# Patient Record
Sex: FEMALE | Race: WHITE | NOT HISPANIC OR LATINO | Employment: UNEMPLOYED | URBAN - METROPOLITAN AREA
[De-identification: names, ages, dates, MRNs, and addresses within clinical notes are randomized per-mention and may not be internally consistent; named-entity substitution may affect disease eponyms.]

---

## 2021-11-28 ENCOUNTER — WALK IN (OUTPATIENT)
Dept: URGENT CARE | Age: 26
End: 2021-11-28

## 2021-11-28 VITALS
OXYGEN SATURATION: 98 % | WEIGHT: 140 LBS | TEMPERATURE: 98.1 F | DIASTOLIC BLOOD PRESSURE: 62 MMHG | SYSTOLIC BLOOD PRESSURE: 118 MMHG | HEART RATE: 78 BPM | RESPIRATION RATE: 16 BRPM

## 2021-11-28 DIAGNOSIS — R21 RASH: Primary | ICD-10-CM

## 2021-11-28 PROCEDURE — 99203 OFFICE O/P NEW LOW 30 MIN: CPT | Performed by: FAMILY MEDICINE

## 2021-11-28 RX ORDER — PREDNISONE 20 MG/1
20 TABLET ORAL DAILY
Qty: 5 TABLET | Refills: 0 | Status: SHIPPED | OUTPATIENT
Start: 2021-11-28

## 2021-11-28 RX ORDER — EPINEPHRINE 0.3 MG/.3ML
0.3 INJECTION SUBCUTANEOUS
COMMUNITY
Start: 2021-06-02

## 2021-11-28 RX ORDER — DESOGESTREL AND ETHINYL ESTRADIOL 0.15-0.03
KIT ORAL
COMMUNITY
Start: 2021-11-19

## 2021-11-28 RX ORDER — FEXOFENADINE HCL 180 MG/1
180 TABLET ORAL DAILY
COMMUNITY

## 2022-06-21 ENCOUNTER — APPOINTMENT (OUTPATIENT)
Dept: URBAN - METROPOLITAN AREA CLINIC 256 | Age: 27
Setting detail: DERMATOLOGY
End: 2022-06-23

## 2022-06-21 VITALS — HEIGHT: 64 IN | WEIGHT: 150 LBS

## 2022-06-21 DIAGNOSIS — L90.5 SCAR CONDITIONS AND FIBROSIS OF SKIN: ICD-10-CM

## 2022-06-21 DIAGNOSIS — Z71.89 OTHER SPECIFIED COUNSELING: ICD-10-CM

## 2022-06-21 DIAGNOSIS — L57.8 OTHER SKIN CHANGES DUE TO CHRONIC EXPOSURE TO NONIONIZING RADIATION: ICD-10-CM

## 2022-06-21 DIAGNOSIS — D22 MELANOCYTIC NEVI: ICD-10-CM

## 2022-06-21 PROBLEM — D22.5 MELANOCYTIC NEVI OF TRUNK: Status: ACTIVE | Noted: 2022-06-21

## 2022-06-21 PROBLEM — D22.0 MELANOCYTIC NEVI OF LIP: Status: ACTIVE | Noted: 2022-06-21

## 2022-06-21 PROBLEM — D22.61 MELANOCYTIC NEVI OF RIGHT UPPER LIMB, INCLUDING SHOULDER: Status: ACTIVE | Noted: 2022-06-21

## 2022-06-21 PROBLEM — D22.9 MELANOCYTIC NEVI, UNSPECIFIED: Status: ACTIVE | Noted: 2022-06-21

## 2022-06-21 PROBLEM — D22.4 MELANOCYTIC NEVI OF SCALP AND NECK: Status: ACTIVE | Noted: 2022-06-21

## 2022-06-21 PROBLEM — D22.39 MELANOCYTIC NEVI OF OTHER PARTS OF FACE: Status: ACTIVE | Noted: 2022-06-21

## 2022-06-21 PROBLEM — D22.72 MELANOCYTIC NEVI OF LEFT LOWER LIMB, INCLUDING HIP: Status: ACTIVE | Noted: 2022-06-21

## 2022-06-21 PROBLEM — D22.71 MELANOCYTIC NEVI OF RIGHT LOWER LIMB, INCLUDING HIP: Status: ACTIVE | Noted: 2022-06-21

## 2022-06-21 PROBLEM — D22.62 MELANOCYTIC NEVI OF LEFT UPPER LIMB, INCLUDING SHOULDER: Status: ACTIVE | Noted: 2022-06-21

## 2022-06-21 PROCEDURE — OTHER MIPS QUALITY: OTHER

## 2022-06-21 PROCEDURE — 99203 OFFICE O/P NEW LOW 30 MIN: CPT

## 2022-06-21 PROCEDURE — OTHER ADDITIONAL NOTES: OTHER

## 2022-06-21 PROCEDURE — OTHER COUNSELING: OTHER

## 2022-06-21 PROCEDURE — OTHER REASSURANCE: OTHER

## 2022-06-21 ASSESSMENT — LOCATION DETAILED DESCRIPTION DERM
LOCATION DETAILED: RIGHT CENTRAL LATERAL NECK
LOCATION DETAILED: RIGHT ANTERIOR PROXIMAL THIGH
LOCATION DETAILED: LEFT INFERIOR CENTRAL MALAR CHEEK
LOCATION DETAILED: RIGHT ANTERIOR DISTAL THIGH
LOCATION DETAILED: RIGHT LOWER CUTANEOUS LIP
LOCATION DETAILED: RIGHT LATERAL 2ND TOE
LOCATION DETAILED: RIGHT DISTAL POSTERIOR THIGH
LOCATION DETAILED: LEFT ANTERIOR PROXIMAL THIGH
LOCATION DETAILED: LEFT PROXIMAL POSTERIOR UPPER ARM
LOCATION DETAILED: PERIUMBILICAL SKIN
LOCATION DETAILED: LEFT BUTTOCK
LOCATION DETAILED: RIGHT ANTERIOR SHOULDER
LOCATION DETAILED: LEFT DISTAL PRETIBIAL REGION
LOCATION DETAILED: LEFT LATERAL SUPERIOR CHEST
LOCATION DETAILED: RIGHT LATERAL ABDOMEN
LOCATION DETAILED: RIGHT PROXIMAL DORSAL FOREARM
LOCATION DETAILED: RIGHT SUPERIOR UPPER BACK

## 2022-06-21 ASSESSMENT — LOCATION SIMPLE DESCRIPTION DERM
LOCATION SIMPLE: NECK
LOCATION SIMPLE: LEFT BUTTOCK
LOCATION SIMPLE: LEFT CHEEK
LOCATION SIMPLE: RIGHT FOREARM
LOCATION SIMPLE: RIGHT 2ND TOE
LOCATION SIMPLE: CHEST
LOCATION SIMPLE: RIGHT POSTERIOR THIGH
LOCATION SIMPLE: RIGHT THIGH
LOCATION SIMPLE: ABDOMEN
LOCATION SIMPLE: RIGHT UPPER BACK
LOCATION SIMPLE: LEFT PRETIBIAL REGION
LOCATION SIMPLE: RIGHT SHOULDER
LOCATION SIMPLE: RIGHT LIP
LOCATION SIMPLE: LEFT POSTERIOR UPPER ARM
LOCATION SIMPLE: LEFT THIGH

## 2022-06-21 ASSESSMENT — LOCATION ZONE DERM
LOCATION ZONE: LEG
LOCATION ZONE: ARM
LOCATION ZONE: FACE
LOCATION ZONE: TRUNK
LOCATION ZONE: LIP
LOCATION ZONE: NECK
LOCATION ZONE: TOE

## 2022-06-21 NOTE — PROCEDURE: ADDITIONAL NOTES
Detail Level: Simple
Additional Notes: Notes from prior dermatologist reviewed--at prior visit this mole measured 9 mm, difficult to see in scanned [facility] notes. See clinical photo in our records and dermoscopy photo. Patient is monitoring lesion carefully and has noted no changes in size, shape, color, or contour. Can continue to be monitored; did offer removal for peace of mind and definitive pathologic evaluation, patient deferred for now.
Render Risk Assessment In Note?: no

## 2022-06-21 NOTE — PROCEDURE: REASSURANCE
Hide Additional Notes?: No
Detail Level: Detailed
Additional Notes (Optional): -Scar from previous procedure

## 2022-11-18 ENCOUNTER — OFFICE VISIT (OUTPATIENT)
Dept: FAMILY MEDICINE | Facility: CLINIC | Age: 27
End: 2022-11-18
Payer: COMMERCIAL

## 2022-11-18 VITALS
SYSTOLIC BLOOD PRESSURE: 119 MMHG | TEMPERATURE: 98.2 F | OXYGEN SATURATION: 96 % | RESPIRATION RATE: 18 BRPM | BODY MASS INDEX: 25.74 KG/M2 | HEIGHT: 64 IN | DIASTOLIC BLOOD PRESSURE: 72 MMHG | WEIGHT: 150.8 LBS | HEART RATE: 93 BPM

## 2022-11-18 DIAGNOSIS — R51.9 NONINTRACTABLE EPISODIC HEADACHE, UNSPECIFIED HEADACHE TYPE: ICD-10-CM

## 2022-11-18 DIAGNOSIS — Z11.4 SCREENING FOR HIV (HUMAN IMMUNODEFICIENCY VIRUS): ICD-10-CM

## 2022-11-18 DIAGNOSIS — Z13.1 SCREENING FOR DIABETES MELLITUS: ICD-10-CM

## 2022-11-18 DIAGNOSIS — E61.1 IRON DEFICIENCY: ICD-10-CM

## 2022-11-18 DIAGNOSIS — Z13.220 LIPID SCREENING: ICD-10-CM

## 2022-11-18 DIAGNOSIS — Z11.59 NEED FOR HEPATITIS C SCREENING TEST: ICD-10-CM

## 2022-11-18 DIAGNOSIS — Z30.8 ENCOUNTER FOR OTHER CONTRACEPTIVE MANAGEMENT: ICD-10-CM

## 2022-11-18 DIAGNOSIS — F41.9 ANXIETY: ICD-10-CM

## 2022-11-18 DIAGNOSIS — Z12.4 CERVICAL CANCER SCREENING: ICD-10-CM

## 2022-11-18 DIAGNOSIS — R79.89 LOW VITAMIN D LEVEL: ICD-10-CM

## 2022-11-18 DIAGNOSIS — Z00.00 ROUTINE HISTORY AND PHYSICAL EXAMINATION OF ADULT: Primary | ICD-10-CM

## 2022-11-18 LAB
ALBUMIN SERPL BCG-MCNC: 4.4 G/DL (ref 3.5–5.2)
ALP SERPL-CCNC: 61 U/L (ref 35–104)
ALT SERPL W P-5'-P-CCNC: 13 U/L (ref 10–35)
ANION GAP SERPL CALCULATED.3IONS-SCNC: 15 MMOL/L (ref 7–15)
AST SERPL W P-5'-P-CCNC: 17 U/L (ref 10–35)
BILIRUB SERPL-MCNC: 0.2 MG/DL
BUN SERPL-MCNC: 14.5 MG/DL (ref 6–20)
CALCIUM SERPL-MCNC: 9.5 MG/DL (ref 8.6–10)
CHLORIDE SERPL-SCNC: 104 MMOL/L (ref 98–107)
CHOLEST SERPL-MCNC: 215 MG/DL
CREAT SERPL-MCNC: 0.75 MG/DL (ref 0.51–0.95)
DEPRECATED HCO3 PLAS-SCNC: 20 MMOL/L (ref 22–29)
ERYTHROCYTE [DISTWIDTH] IN BLOOD BY AUTOMATED COUNT: 12.6 % (ref 10–15)
FERRITIN SERPL-MCNC: 5 NG/ML (ref 6–175)
GFR SERPL CREATININE-BSD FRML MDRD: >90 ML/MIN/1.73M2
GLUCOSE SERPL-MCNC: 95 MG/DL (ref 70–99)
HBA1C MFR BLD: 5.3 % (ref 0–5.6)
HCT VFR BLD AUTO: 38.1 % (ref 35–47)
HDLC SERPL-MCNC: 64 MG/DL
HGB BLD-MCNC: 12.5 G/DL (ref 11.7–15.7)
LDLC SERPL CALC-MCNC: 130 MG/DL
MCH RBC QN AUTO: 26.8 PG (ref 26.5–33)
MCHC RBC AUTO-ENTMCNC: 32.8 G/DL (ref 31.5–36.5)
MCV RBC AUTO: 82 FL (ref 78–100)
NONHDLC SERPL-MCNC: 151 MG/DL
PLATELET # BLD AUTO: 319 10E3/UL (ref 150–450)
POTASSIUM SERPL-SCNC: 4.7 MMOL/L (ref 3.4–5.3)
PROT SERPL-MCNC: 7.4 G/DL (ref 6.4–8.3)
RBC # BLD AUTO: 4.67 10E6/UL (ref 3.8–5.2)
SODIUM SERPL-SCNC: 139 MMOL/L (ref 136–145)
TRIGL SERPL-MCNC: 107 MG/DL
WBC # BLD AUTO: 9.6 10E3/UL (ref 4–11)

## 2022-11-18 PROCEDURE — 80053 COMPREHEN METABOLIC PANEL: CPT | Performed by: INTERNAL MEDICINE

## 2022-11-18 PROCEDURE — 99214 OFFICE O/P EST MOD 30 MIN: CPT | Mod: 25 | Performed by: INTERNAL MEDICINE

## 2022-11-18 PROCEDURE — 99385 PREV VISIT NEW AGE 18-39: CPT | Performed by: INTERNAL MEDICINE

## 2022-11-18 PROCEDURE — 83036 HEMOGLOBIN GLYCOSYLATED A1C: CPT | Performed by: INTERNAL MEDICINE

## 2022-11-18 PROCEDURE — 82728 ASSAY OF FERRITIN: CPT | Performed by: INTERNAL MEDICINE

## 2022-11-18 PROCEDURE — 80061 LIPID PANEL: CPT | Performed by: INTERNAL MEDICINE

## 2022-11-18 PROCEDURE — 36415 COLL VENOUS BLD VENIPUNCTURE: CPT | Performed by: INTERNAL MEDICINE

## 2022-11-18 PROCEDURE — 82306 VITAMIN D 25 HYDROXY: CPT | Performed by: INTERNAL MEDICINE

## 2022-11-18 PROCEDURE — 85027 COMPLETE CBC AUTOMATED: CPT | Performed by: INTERNAL MEDICINE

## 2022-11-18 RX ORDER — DESOGESTREL AND ETHINYL ESTRADIOL 0.15-0.03
1 KIT ORAL DAILY
Qty: 84 TABLET | Refills: 1 | Status: SHIPPED | OUTPATIENT
Start: 2022-11-18 | End: 2022-12-15

## 2022-11-18 RX ORDER — FERROUS SULFATE 325(65) MG
325 TABLET, DELAYED RELEASE (ENTERIC COATED) ORAL DAILY
Qty: 90 TABLET | Refills: 0 | Status: SHIPPED | OUTPATIENT
Start: 2022-11-18

## 2022-11-18 RX ORDER — PROPRANOLOL HYDROCHLORIDE 10 MG/1
TABLET ORAL
Qty: 90 TABLET | Refills: 0 | Status: SHIPPED | OUTPATIENT
Start: 2022-11-18 | End: 2023-02-14

## 2022-11-18 ASSESSMENT — ENCOUNTER SYMPTOMS
EYE PAIN: 0
NAUSEA: 0
HEMATURIA: 0
PARESTHESIAS: 0
DIZZINESS: 0
MYALGIAS: 0
FEVER: 0
HEADACHES: 0
WEAKNESS: 0
HEMATOCHEZIA: 0
CHILLS: 0
HEARTBURN: 0
PALPITATIONS: 0
CONSTIPATION: 0
ARTHRALGIAS: 0
COUGH: 0
FREQUENCY: 0
ABDOMINAL PAIN: 0
SHORTNESS OF BREATH: 0
NERVOUS/ANXIOUS: 1
JOINT SWELLING: 0
DYSURIA: 0
DIARRHEA: 0
SORE THROAT: 0

## 2022-11-18 ASSESSMENT — PAIN SCALES - GENERAL: PAINLEVEL: NO PAIN (0)

## 2022-11-18 NOTE — PROGRESS NOTES
SUBJECTIVE:   CC: Rebeca is an 27 year old who presents for preventive health visit.     Patient has been advised of split billing requirements and indicates understanding: Yes  Healthy Habits:     Getting at least 3 servings of Calcium per day:  Yes    Bi-annual eye exam:  Yes    Dental care twice a year:  Yes    Sleep apnea or symptoms of sleep apnea:  None    Diet:  Gluten-free/reduced    Frequency of exercise:  4-5 days/week    Duration of exercise:  45-60 minutes    Taking medications regularly:  Yes    Medication side effects:  Not applicable    PHQ-2 Total Score: 2    Additional concerns today:  Yes      OCP  ISIBLOOM    PROPRANOL 10 MG PRN ANXIETY    HISTORY OF MIGRAINE, HAS BAD ALLERGIES, GETTING ALLERGY SHOTS, AT AN [facility]ERGY CLINIC IN [facility], HAS HEADACHE AL HER LIFE, LIGHT OR NOSE DO NOT BOTHER HER SOMETIMES GETS NAUSEOUS, 1 TIME EVERY TOW.      On gluten free diet, had abdominal cramps,     Low ferritin, had anemia in past, periods are sometimes heavy, on iron supplerments    Had hemorrhoids, had blood in stool, one time 3 months ago    Gluten free diet , helps with your stress and energy, had loose stools prior to     PGF has crohn's disease      Has h/o anxiety , not depression, uses once every 2 weeks  Always work related, work related, helps and levels her out          Today's PHQ-2 Score:   PHQ-2 ( 1999 [employer]) 11/18/2022   Q1: Little interest or pleasure in doing things 1   Q2: Feeling down, depressed or hopeless 1   PHQ-2 Score 2   Q1: Little interest or pleasure in doing things Several days   Q2: Feeling down, depressed or hopeless Several days   PHQ-2 Score 2       Have you ever done Advance Care Planning? (For example, a Health Directive, POLST, or a discussion with a medical provider or your loved ones about your wishes): Yes, patient states has an Advance Care Planning document and will bring a copy to the clinic.    Social History     Tobacco Use     Smoking status: Not on file     Smokeless  tobacco: Not on file   Substance Use Topics     Alcohol use: Not on file     If you drink alcohol do you typically have >3 drinks per day or >7 drinks per week? No    Alcohol Use 11/18/2022   Prescreen: >3 drinks/day or >7 drinks/week? No   Prescreen: >3 drinks/day or >7 drinks/week? -   No flowsheet data found.    Reviewed orders with patient.  Reviewed health maintenance and updated orders accordingly - Yes  Lab work is in process  Labs reviewed in EPIC  BP Readings from Last 3 Encounters:   11/18/22 119/72    Wt Readings from Last 3 Encounters:   11/18/22 68.4 kg (150 lb 12.8 oz)                  There is no problem list on file for this patient.    No past surgical history on file.    Social History     Tobacco Use     Smoking status: Not on file     Smokeless tobacco: Not on file   Substance Use Topics     Alcohol use: Not on file     No family history on file.      Current Outpatient Medications   Medication Sig Dispense Refill     desogestrel-ethinyl estradiol (APRI) 0.15-30 MG-MCG tablet Take 1 tablet by mouth daily 84 tablet 1     ferrous sulfate (FE TABS) 325 (65 Fe) MG EC tablet Take 1 tablet (325 mg) by mouth daily take with vitamin C or orange juice 90 tablet 0     propranolol (INDERAL) 10 MG tablet Take one tablet daily as needed for anxiety 90 tablet 0     Not on File  Recent Labs   Lab Test 11/18/22  1051   A1C 5.3   *   HDL 64   TRIG 107   ALT 13   CR 0.75   GFRESTIMATED >90   POTASSIUM 4.7        Breast Cancer Screening:  Any new diagnosis of family breast, ovarian, or bowel cancer? Yes  mat GM had breat ca 2x        FHS-7:   Breast CA Risk Assessment (FHS-7) 11/18/2022   Did any of your first-degree relatives have breast or ovarian cancer? No   Did any of your relatives have bilateral breast cancer? No   Did any man in your family have breast cancer? No   Did any woman in your family have breast and ovarian cancer? Yes   Did any woman in your family have breast cancer before age 50 y? No  "  Do you have 2 or more relatives with breast and/or ovarian cancer? No   Do you have 2 or more relatives with breast and/or bowel cancer? No     click delete button to remove this line now  Patient under 40 years of age: Routine Mammogram Screening not recommended.   Pertinent mammograms are reviewed under the imaging tab.    History of abnormal Pap smear: Last 3 Pap Results: No results found for: PAP     Reviewed and updated as needed this visit by clinical staff      Problems             Reviewed and updated as needed this visit by Provider      Problems            No past medical history on file.   No past surgical history on file.  OB History   No obstetric history on file.       Review of Systems  CONSTITUTIONAL: NEGATIVE for fever, chills, change in weight  INTEGUMENTARU/SKIN: NEGATIVE for worrisome rashes, moles or lesions  EYES: NEGATIVE for vision changes or irritation  ENT: NEGATIVE for ear, mouth and throat problems  RESP: NEGATIVE for significant cough or SOB  BREAST: NEGATIVE for masses, tenderness or discharge  CV: NEGATIVE for chest pain, palpitations or peripheral edema  GI: NEGATIVE for nausea, abdominal pain, heartburn, or change in bowel habits  : NEGATIVE for unusual urinary or vaginal symptoms. Periods are regular.  MUSCULOSKELETAL: NEGATIVE for significant arthralgias or myalgia  NEURO: NEGATIVE for weakness, dizziness or paresthesias  PSYCHIATRIC: NEGATIVE for changes in mood or affect     OBJECTIVE:   /72 (BP Location: Left arm, Patient Position: Sitting, Cuff Size: Adult Regular)   Pulse 93   Temp 98.2  F (36.8  C) (Temporal)   Resp 18   Ht 1.613 m (5' 3.5\")   Wt 68.4 kg (150 lb 12.8 oz)   LMP 09/09/2022 (Exact Date)   SpO2 96%   BMI 26.29 kg/m    Physical Exam  GENERAL: healthy, alert and no distress  EYES: Eyes grossly normal to inspection, PERRL and conjunctivae and sclerae normal  HENT: ear canals and TM's normal, nose and mouth without ulcers or lesions  NECK: no " adenopathy, no asymmetry, masses, or scars and thyroid normal to palpation  RESP: lungs clear to auscultation - no rales, rhonchi or wheezes  CV: regular rate and rhythm, normal S1 S2, no S3 or S4, no murmur, click or rub, no peripheral edema and peripheral pulses strong  ABDOMEN: soft, nontender, no hepatosplenomegaly, no masses and bowel sounds normal  MS: no gross musculoskeletal defects noted, no edema  SKIN: no suspicious lesions or rashes  NEURO: Normal strength and tone, mentation intact and speech normal  PSYCH: mentation appears normal, affect normal/bright    Diagnostic Test Results:  Labs reviewed in Epic    ASSESSMENT/PLAN:   Rebeca was seen today for physical.    Diagnoses and all orders for this visit:    Routine history and physical examination of adult    Screening for HIV (human immunodeficiency virus)  Comments:  Patient declined    Need for hepatitis C screening test  Comments:  Patient declined      Cervical cancer screening  Comments:  Pap smear up-to-date    Encounter for other contraceptive management  Comments:  Discussed side effects of chronic oral hormone contraception.  She has history of chronic headaches paranasal sinuses frontal ethmoidal area denies any aura sXS  Orders:  -     Ob/Gyn Referral; Future  -     desogestrel-ethinyl estradiol (APRI) 0.15-30 MG-MCG tablet; Take 1 tablet by mouth daily    Anxiety  -     propranolol (INDERAL) 10 MG tablet; Take one tablet daily as needed for anxiety    Iron deficiency  -     Ferritin  -     CBC with platelets  -     ferrous sulfate (FE TABS) 325 (65 Fe) MG EC tablet; Take 1 tablet (325 mg) by mouth daily take with vitamin C or orange juice    Low vitamin D level  -     Vitamin D Deficiency    Screening for diabetes mellitus  -     Hemoglobin A1c    Lipid screening  -     Lipid panel reflex to direct LDL Fasting; Future  -     Lipid panel reflex to direct LDL Fasting    Nonintractable episodic headache, unspecified headache  type  Comments:  Chronic frontal ethmoidal paranasal, unsure if is related to sinus headaches or history of migraine she denies any aura associated.  On chroni oral contraceptiv  Orders:  -     CBC with platelets  -     Comprehensive metabolic panel (BMP + Alb, Alk Phos, ALT, AST, Total. Bili, TP); Future  -     Comprehensive metabolic panel (BMP + Alb, Alk Phos, ALT, AST, Total. Bili, TP)    Other orders  -     REVIEW OF HEALTH MAINTENANCE PROTOCOL ORDERS    has allergies to dust, pollen, cats dogs, getting allergy  Shots. Started in [place] 2022  Thinks headaches are allergies related, no aura associated which we discussed in detail as she is on OCP  TAKES PROPRANOLOL PRN FOR ANXIETY DISCUSSED SIDE EFFECTS, SHE HOLDS PRIOR TO ALLERGY SHOTS  Headache 5/20  ;Continues to  Function and work, ibuprofen helps.  No migraines with aura, she can continue on oral contraceptives.  She takes them 10 weeks at a time and then has a withdrawal bleeding at least 3-4 times per year establish with GYN, she is looking at other than hormonal types of contraception.  She takes propranolol as needed for anxiety, situational once per 2 weeks, discussed side effects tolerating well.  Gets allergy shots for history of allergies ,continue follow-up with allergy clinic.  Check some basic labs today.  She has low ferritin, she has some heavy menses at times.  She remains on iron replacement low-dose 65 mg daily and take with vitamin C or orange juice.  Do some screening labs today.    Patient has been advised of split billing requirements and indicates understanding: Yes      COUNSELING:  Reviewed preventive health counseling, as reflected in patient instructions       Regular exercise       Healthy diet/nutrition       Vision screening       Hearing screening       Aspirin prophylaxis       Alcohol Use       Contraception       Family planning       Folic Acid       Safe sex practices/STD prevention       Consider Hep C screening for all  patients one time for ages 18-79 years       Syphilis screening for high risk patients        HIV screeninx in teen years, 1x in adult years, and at intervals if high risk       Advance Care Planning        She has no history on file for tobacco use.          Sathish Lui MD  [facility]    Total time was 30 minutes review of records ,addressing multiple health conditions in addition to preventative yearly physical.

## 2022-11-19 LAB — DEPRECATED CALCIDIOL+CALCIFEROL SERPL-MC: 31 UG/L (ref 20–75)

## 2022-11-21 ENCOUNTER — MYC MEDICAL ADVICE (OUTPATIENT)
Dept: FAMILY MEDICINE | Facility: CLINIC | Age: 27
End: 2022-11-21

## 2022-11-23 NOTE — TELEPHONE ENCOUNTER
Per chart review, refill sent to patient's pharmacy on 11/18/22:    desogestrel-ethinyl estradiol (APRI) 0.15-30 MG-MCG tablet 84 tablet 1 11/18/2022  --   Sig - Route: Take 1 tablet by mouth daily - Oral   Sent to pharmacy as: Desogestrel-Ethinyl Estradiol 0.15-30 MG-MCG Oral Tablet (APRI)   Class: E-Prescribe   Order: [id number]   E-Prescribing Status: Receipt confirmed by pharmacy (11/18/2022 10:32 AM CST)     Sent patient [employer] message to follow up with pharmacy.    Kimberlyn Mcdonough RN  [facility]

## 2022-12-13 NOTE — PROGRESS NOTES
"  SUBJECTIVE:                                                   Rebeca Walters is a 27 year old female who presents to clinic today for the following health issue(s):  Patient presents with:  Consult: Discuss birth control options, currently on continuous oral contraceptives. Has tried an IUD in the past but removed after one year d/t side effects.    HPI:  New patient to me here today to discuss her contraceptive options.  She is newly  and they are not planning to have a family for another couple of years.  She has been on oral contraceptive tablets since she was 14.  She had a short stent with a hormonal laden IUD for about 1 year but did have emotional fluctuations on the device.  This was removed and she went back to oral contraceptive tablets.  She does have chronic tension-like headaches.  This could also be related to her sinuses.  She had the same amount and intensity of headaches while on the IUD.    She and her  recently relocated to [place] from [place] and she has been dispensed a different generic pill in the last couple of months.  She is questioning whether there is another option such as something more \"natural\".  She does have a history of anemia and is on iron tablets.    She takes the pills continuously for 3 months and allows a menstrual cycle every 3 to 4 months.    Patient's last menstrual period was 2022.    Patient is sexually active, .  Using oral contraceptives for contraception.    reports that she has never smoked. She has never used smokeless tobacco.    STD testing offered?  Declined    Health maintenance updated: no, due for pap.    Today's PHQ-2 Score:   PHQ-2 (  [employer]) 12/15/2022   Q1: Little interest or pleasure in doing things 0   Q2: Feeling down, depressed or hopeless 0   PHQ-2 Score 0   Q1: Little interest or pleasure in doing things -   Q2: Feeling down, depressed or hopeless -   PHQ-2 Score -     Today's PHQ-9 Score:   PHQ-9 SCORE 12/15/2022 " "  PHQ-9 Total Score 0     Today's ALIZE-7 Score:   ALIZE-7 SCORE 12/15/2022   Total Score 5       Problem list and histories reviewed & adjusted, as indicated.  Additional history: as documented.    There is no problem list on file for this patient.    No past surgical history on file.   Social History     Tobacco Use     Smoking status: Never     Smokeless tobacco: Never   Substance Use Topics     Alcohol use: Not on file           Current Outpatient Medications   Medication Sig     desogestrel-ethinyl estradiol (APRI) 0.15-30 MG-MCG tablet Take 1 tablet by mouth daily . Active tabs only, skip placebo pills. No off week.     EPINEPHrine (ANY BX GENERIC EQUIV) 0.3 MG/0.3ML injection 2-pack Inject 0.3 mg into the muscle     ferrous sulfate (FE TABS) 325 (65 Fe) MG EC tablet Take 1 tablet (325 mg) by mouth daily take with vitamin C or orange juice     propranolol (INDERAL) 10 MG tablet Take one tablet daily as needed for anxiety     albuterol (PROAIR HFA/PROVENTIL HFA/VENTOLIN HFA) 108 (90 Base) MCG/ACT inhaler INHALE 2 PUFFS BY MOUTH INTO THE LUNGS EVERY 4-6 HOURS AS NEEDED FOR WHEEZE     fexofenadine (ALLEGRA) 180 MG tablet Take 180 mg by mouth daily     Multiple Vitamins-Iron (MULTIVITAMIN/IRON PO) Take 1 tablet by mouth daily     No current facility-administered medications for this visit.     Allergies   Allergen Reactions     Gluten Meal Diarrhea, Fatigue, Other (See Comments), GI Disturbance and Unknown       ROS:  12 point review of systems negative other than symptoms noted below or in the HPI.  No urinary frequency or dysuria, bladder or kidney problems      OBJECTIVE:     /64   Ht 1.613 m (5' 3.5\")   Wt 70.3 kg (155 lb)   LMP 11/19/2022   BMI 27.03 kg/m    Body mass index is 27.03 kg/m .    Exam:  Constitutional:  Appearance: Well nourished, well developed alert, in no acute distress  Psychiatric:  Mentation appears normal and affect normal/bright.     In-Clinic Test Results:  No results found for " this or any previous visit (from the past 24 hour(s)).    ASSESSMENT/PLAN:                                                        ICD-10-CM    1. General counseling and advice on contraceptive management  Z30.09       2. Encounter for other contraceptive management  Z30.8 desogestrel-ethinyl estradiol (APRI) 0.15-30 MG-MCG tablet    Discussed side effects of chronic oral hormone contraception.  She has history of chronic headaches paranasal sinuses frontal ethmoidal area denies any aura sXS          There are no Patient Instructions on file for this visit.    27-year-old female with what is likely a transition.  From changing a generic pill.  We discussed alternative options such as the patch or the NuvaRing.  The patient would like to continue with the pill as she is comfortable with this method.  I do not believe she is a great candidate for the ParaGard IUD due to her history of anemia.  I do not believe her headaches are due to hormones as she had no change in her headache status while she was on the Mirena IUD.  We did refill her pills to use continuously for 1 year.  She is to call with any questions or concerns.  We did recommend that she have a Pap smear next year.    (15 minutes was spent on the date of the encounter doing chart review, review of outside records, review and interpretation of pertinent test results, history and exam, documentation, patient counseling, and further activities as noted above.)      MICHAEL Jo CNP  [facility]

## 2022-12-15 ENCOUNTER — OFFICE VISIT (OUTPATIENT)
Dept: OBGYN | Facility: CLINIC | Age: 27
End: 2022-12-15
Payer: COMMERCIAL

## 2022-12-15 VITALS
WEIGHT: 155 LBS | BODY MASS INDEX: 26.46 KG/M2 | SYSTOLIC BLOOD PRESSURE: 112 MMHG | DIASTOLIC BLOOD PRESSURE: 64 MMHG | HEIGHT: 64 IN

## 2022-12-15 DIAGNOSIS — Z30.09 GENERAL COUNSELING AND ADVICE ON CONTRACEPTIVE MANAGEMENT: Primary | ICD-10-CM

## 2022-12-15 DIAGNOSIS — Z30.8 ENCOUNTER FOR OTHER CONTRACEPTIVE MANAGEMENT: ICD-10-CM

## 2022-12-15 PROCEDURE — 99203 OFFICE O/P NEW LOW 30 MIN: CPT | Performed by: NURSE PRACTITIONER

## 2022-12-15 RX ORDER — DESOGESTREL AND ETHINYL ESTRADIOL 0.15-0.03
1 KIT ORAL DAILY
Qty: 112 TABLET | Refills: 3 | Status: SHIPPED | OUTPATIENT
Start: 2022-12-15 | End: 2023-03-24

## 2022-12-15 RX ORDER — FEXOFENADINE HCL 180 MG/1
180 TABLET ORAL DAILY
COMMUNITY

## 2022-12-15 RX ORDER — EPINEPHRINE 0.3 MG/.3ML
0.3 INJECTION SUBCUTANEOUS
COMMUNITY
Start: 2021-06-02

## 2022-12-15 RX ORDER — ALBUTEROL SULFATE 90 UG/1
AEROSOL, METERED RESPIRATORY (INHALATION)
COMMUNITY
Start: 2022-01-21

## 2022-12-15 ASSESSMENT — ANXIETY QUESTIONNAIRES
3. WORRYING TOO MUCH ABOUT DIFFERENT THINGS: SEVERAL DAYS
GAD7 TOTAL SCORE: 5
IF YOU CHECKED OFF ANY PROBLEMS ON THIS QUESTIONNAIRE, HOW DIFFICULT HAVE THESE PROBLEMS MADE IT FOR YOU TO DO YOUR WORK, TAKE CARE OF THINGS AT HOME, OR GET ALONG WITH OTHER PEOPLE: SOMEWHAT DIFFICULT
7. FEELING AFRAID AS IF SOMETHING AWFUL MIGHT HAPPEN: SEVERAL DAYS
1. FEELING NERVOUS, ANXIOUS, OR ON EDGE: SEVERAL DAYS
6. BECOMING EASILY ANNOYED OR IRRITABLE: SEVERAL DAYS
GAD7 TOTAL SCORE: 5
5. BEING SO RESTLESS THAT IT IS HARD TO SIT STILL: NOT AT ALL
2. NOT BEING ABLE TO STOP OR CONTROL WORRYING: SEVERAL DAYS

## 2022-12-15 ASSESSMENT — PATIENT HEALTH QUESTIONNAIRE - PHQ9
5. POOR APPETITE OR OVEREATING: NOT AT ALL
SUM OF ALL RESPONSES TO PHQ QUESTIONS 1-9: 0

## 2023-02-09 ENCOUNTER — E-VISIT (OUTPATIENT)
Dept: FAMILY MEDICINE | Facility: CLINIC | Age: 28
End: 2023-02-09
Payer: COMMERCIAL

## 2023-02-09 DIAGNOSIS — R42 DIZZY SPELLS: Primary | ICD-10-CM

## 2023-02-09 PROCEDURE — 99207 PR NON-BILLABLE SERV PER CHARTING: CPT | Performed by: INTERNAL MEDICINE

## 2023-02-09 NOTE — PATIENT INSTRUCTIONS
Thank you for choosing us for your care. I think an in-clinic visit would be best next steps based on your symptoms. Please schedule a clinic appointment; you won t be charged for this eVisit.      You can schedule an appointment right here in [facility], or call [phone]

## 2023-03-21 DIAGNOSIS — Z30.8 ENCOUNTER FOR OTHER CONTRACEPTIVE MANAGEMENT: ICD-10-CM

## 2023-03-24 RX ORDER — DESOGESTREL AND ETHINYL ESTRADIOL 0.15-0.03
KIT ORAL
Qty: 84 TABLET | Refills: 0 | Status: SHIPPED | OUTPATIENT
Start: 2023-03-24

## 2023-04-04 ENCOUNTER — OFFICE VISIT (OUTPATIENT)
Dept: URGENT CARE | Facility: URGENT CARE | Age: 28
End: 2023-04-04
Payer: COMMERCIAL

## 2023-04-04 VITALS
TEMPERATURE: 98.2 F | BODY MASS INDEX: 27.38 KG/M2 | OXYGEN SATURATION: 98 % | DIASTOLIC BLOOD PRESSURE: 69 MMHG | HEART RATE: 82 BPM | WEIGHT: 157 LBS | SYSTOLIC BLOOD PRESSURE: 118 MMHG

## 2023-04-04 DIAGNOSIS — K64.8 INTERNAL HEMORRHOID: Primary | ICD-10-CM

## 2023-04-04 PROCEDURE — 99203 OFFICE O/P NEW LOW 30 MIN: CPT | Performed by: PHYSICIAN ASSISTANT

## 2023-04-04 RX ORDER — HYDROCORTISONE ACETATE 25 MG/1
25 SUPPOSITORY RECTAL 2 TIMES DAILY
Qty: 14 SUPPOSITORY | Refills: 0 | Status: SHIPPED | OUTPATIENT
Start: 2023-04-04 | End: 2023-04-11

## 2023-04-04 NOTE — PROGRESS NOTES
Chief Complaint   Patient presents with     Rectal Problem     1-2 weeks - blood in stool        ASSESSMENT/PLAN:  Rebeca was seen today for rectal problem.    Diagnoses and all orders for this visit:    Internal hemorrhoid  -     Adult Colorectal Surgery  Referral; Future  -     hydrocortisone (ANUSOL-HC) 25 MG suppository; Place 1 suppository (25 mg) rectally 2 times daily for 7 days    Most consistent with internal hemorrhoid given her history.  Did consider other cause such as malignancy, infection among others.  Continue sitz bath's, fiber, recommend MiraLAX.  We will do a trial of Anusol and if its not improving follow-up with colorectal surgery for banding.  Patient deferred examination today    Emmett Cano PA-C      SUBJECTIVE:  eRbeca is a 27 year old female who presents to urgent care with 1 to 2 weeks of rectal bleeding.  She does have a history of this and has believed its been internal hemorrhoids.  Seems to be more associated with stress.  She is more stressed than usual.  It is lasting a little bit longer than usual.  Happens with most bowel movements and has blood on the toilet paper but every once in a while in toilet bowl.  Stools are firm and may be a little hard but no constipation or diarrhea.  No fevers or chills.  No weight loss or weight gain.  Did not just returned from [place] 2 weeks ago but does not know of any exposures.  No nausea or vomiting or diarrhea  Grandfather did have colon cancer diagnosed in his 60s.  Patient does have a history of celiac disease.  No personal history of IBD or IBS but has this in the family.  She had a colonoscopy 7 years ago    ROS: Pertinent ROS neg other than the symptoms noted above in the HPI.     OBJECTIVE:  /69   Pulse 82   Temp 98.2  F (36.8  C) (Tympanic)   Wt 71.2 kg (157 lb)   SpO2 98%   BMI 27.38 kg/m     GENERAL: healthy, alert and no distress    DIAGNOSTICS    No results found for any visits on 04/04/23.     Current  Outpatient Medications   Medication     albuterol (PROAIR HFA/PROVENTIL HFA/VENTOLIN HFA) 108 (90 Base) MCG/ACT inhaler     EPINEPHrine (ANY BX GENERIC EQUIV) 0.3 MG/0.3ML injection 2-pack     ferrous sulfate (FE TABS) 325 (65 Fe) MG EC tablet     fexofenadine (ALLEGRA) 180 MG tablet     ISIBLOOM 0.15-30 MG-MCG tablet     Multiple Vitamins-Iron (MULTIVITAMIN/IRON PO)     propranolol (INDERAL) 10 MG tablet     No current facility-administered medications for this visit.      There is no problem list on file for this patient.     Past Medical History:   Diagnosis Date     Anxiety      Past Surgical History:   Procedure Laterality Date     NO HISTORY OF SURGERY       Family History   Problem Relation Age of Onset     Breast Cancer Maternal Grandmother      Hyperlipidemia Paternal Grandfather      Social History     Tobacco Use     Smoking status: Never     Smokeless tobacco: Never   Vaping Use     Vaping status: Not on file   Substance Use Topics     Alcohol use: Not on file              The plan of care was discussed with the patient. They understand and agree with the course of treatment prescribed. A printed summary was given including instructions and medications.  The use of Dragon/Assignment Editor dictation services may have been used to construct the content in this note; any grammatical or spelling errors are non-intentional. Please contact the author of this note directly if you are in need of any clarification.

## 2023-04-11 NOTE — TELEPHONE ENCOUNTER
Diagnosis, Referred by & from: Internal Hemorrhoids; referred by TANYA Ruiz   Appt date: 5/11/2023   NOTES STATUS DETAILS   OFFICE NOTE from referring provider [facility]:  4/4/23 - UC OV with TANYA Ruiz   OFFICE NOTE from other specialist Care Everywhere / [facility]:  12/15/22 - OBGYN OV with Mesha Dhillon NP  11/18/22 - PCC OV with Dr. Hu Wing:  4/3/22 - PCC OV with Dr. Camarena    [facility]:  5/8/19 - OBGYN OV with Dr. Diane   DISCHARGE SUMMARY from hospital N/A    DISCHARGE REPORT from the ER N/A    OPERATIVE REPORT N/A    MEDICATION LIST Internal    LABS N/A    DIAGNOSTIC PROCEDURES N/A    IMAGING (DISC & REPORT) N/A

## 2023-05-04 ASSESSMENT — ENCOUNTER SYMPTOMS
ABDOMINAL PAIN: 1
NAUSEA: 0
VOMITING: 0
NERVOUS/ANXIOUS: 1
INSOMNIA: 1
DIARRHEA: 1
BLOOD IN STOOL: 1
BOWEL INCONTINENCE: 0
PANIC: 0
CONSTIPATION: 0
HEARTBURN: 0
BLOATING: 1
JAUNDICE: 0
RECTAL PAIN: 1
DEPRESSION: 1
DECREASED CONCENTRATION: 1

## 2023-05-11 ENCOUNTER — OFFICE VISIT (OUTPATIENT)
Dept: SURGERY | Facility: CLINIC | Age: 28
End: 2023-05-11
Attending: PHYSICIAN ASSISTANT
Payer: COMMERCIAL

## 2023-05-11 ENCOUNTER — PRE VISIT (OUTPATIENT)
Dept: SURGERY | Facility: CLINIC | Age: 28
End: 2023-05-11

## 2023-05-11 VITALS
OXYGEN SATURATION: 99 % | SYSTOLIC BLOOD PRESSURE: 117 MMHG | DIASTOLIC BLOOD PRESSURE: 78 MMHG | WEIGHT: 156.3 LBS | BODY MASS INDEX: 26.69 KG/M2 | HEIGHT: 64 IN | HEART RATE: 79 BPM

## 2023-05-11 DIAGNOSIS — K60.2 ANAL FISSURE: Primary | ICD-10-CM

## 2023-05-11 DIAGNOSIS — K64.8 INTERNAL HEMORRHOID: ICD-10-CM

## 2023-05-11 PROCEDURE — 99203 OFFICE O/P NEW LOW 30 MIN: CPT | Performed by: NURSE PRACTITIONER

## 2023-05-11 ASSESSMENT — PAIN SCALES - GENERAL: PAINLEVEL: NO PAIN (0)

## 2023-05-11 NOTE — PROGRESS NOTES
"Colon and Rectal Surgery Consult Clinic Note    Date: 2023     Referring provider:  Emmett Cano PA-C  [facility]  [address] [place] [address]     RE: Rebeca Walters  : 1995  NATI: 2023    Rebeca Walters is a very pleasant 27 year old female with rectal bleeding.    HPI:  Was having rectal bleeding constantly for about 3 weeks. Was then off and on and now no bleeding for about 2 weeks. Used a suppository that was helpful. Was having pain with bowel movements and this still occurs. Feels like tearing with large stools. Stools can be hard. Has celiac but she does not always stick to this diet. Takes a MVI with iron and this causes stools to be harder.  Family history of grandfather with Crohn's disease. Had a colonoscopy in  or  and that was normal.     Physical Examination:  /78 (BP Location: Left arm, Patient Position: Sitting, Cuff Size: Adult Regular)   Pulse 79   Ht 5' 4\"   Wt 156 lb 4.8 oz   SpO2 99%   BMI 26.83 kg/m    General: alert, oriented, in no acute distress, sitting comfortably  HEENT: mucous membranes moist     Perianal external examination: Exam was chaperoned by Eloy Khanna, EMT-P   Perianal skin: Intact with no excoriation or lichenification.  Lesions: No evidence of an external lesion, nodularity, or induration in the perianal region.  Eversion of buttocks: There was not evidence of an anal fissure. Details: anterior midline anal fissure.  Skin tags or external hemorrhoids: tiny skin tag in the posterior midline.    Digital rectal examination: Was deferred in order not to cause further trauma    Anoscopy: Was deferred in order not to cause further trauma    Assessment/Plan: 27 year old female with anal fissure. I discussed that this is likely the source of the pain and bleeding. Discussed the importance of keeping stools soft and easy to pass while healing fissure.  Recommended starting on a daily fiber supplement and can add in a " laxative in addition as needed.  Will treat with topical diltiazem with follow up in 8 weeks. Discussed that it may take several weeks for symptoms to improve. If no improvement is noted after 4 weeks, return to clinic and discuss further intervention such as Botox injections.  Advised the use of Tylenol, ibuprofen, and warm tub baths for any pain. Patient's questions were answered to her stated satisfaction and she is in agreement with this plan.     Medical history:  Past Medical History:   Diagnosis Date     Anxiety        Surgical history:  Past Surgical History:   Procedure Laterality Date     NO HISTORY OF SURGERY         Problem list:  There are no problems to display for this patient.      Medications:  Current Outpatient Medications   Medication Sig Dispense Refill     albuterol (PROAIR HFA/PROVENTIL HFA/VENTOLIN HFA) 108 (90 Base) MCG/ACT inhaler INHALE 2 PUFFS BY MOUTH INTO THE LUNGS EVERY 4-6 HOURS AS NEEDED FOR WHEEZE       EPINEPHrine (ANY BX GENERIC EQUIV) 0.3 MG/0.3ML injection 2-pack Inject 0.3 mg into the muscle       ferrous sulfate (FE TABS) 325 (65 Fe) MG EC tablet Take 1 tablet (325 mg) by mouth daily take with vitamin C or orange juice 90 tablet 0     fexofenadine (ALLEGRA) 180 MG tablet Take 180 mg by mouth daily       ISIBLOOM 0.15-30 MG-MCG tablet TAKE 1 TABLET BY MOUTH DAILY 84 tablet 0     Multiple Vitamins-Iron (MULTIVITAMIN/IRON PO) Take 1 tablet by mouth daily       propranolol (INDERAL) 10 MG tablet TAKE 1 TABLET BY MOUTH DAILY AS NEEDED FOR ANXIETY 90 tablet 1       Allergies:  Allergies   Allergen Reactions     Gluten Meal Diarrhea, Fatigue, Other (See Comments), GI Disturbance and Unknown       Family history:  Family History   Problem Relation Age of Onset     Breast Cancer Maternal Grandmother      Hyperlipidemia Paternal Grandfather        Social history:  Social History     Tobacco Use     Smoking status: Never     Smokeless tobacco: Never   Vaping Use     Vaping status: Not on  "file   Substance Use Topics     Alcohol use: Not on file    Marital status: .    Nursing Notes:   Eloy Khanna, EMT  5/11/2023  8:35 AM  Signed  No chief complaint on file.      Vitals:    05/11/23 0834   BP: 117/78   BP Location: Left arm   Patient Position: Sitting   Cuff Size: Adult Regular   Pulse: 79   SpO2: 99%   Weight: 156 lb 4.8 oz   Height: 5' 4\"       Body mass index is 26.83 kg/m .     Eloy Khanna, EMT- P         20 minutes spent on the date of encounter performing chart review, history and exam, documentation and further activities as noted above.    MICHAEL Daniels, NP-C  Colon and Rectal Surgery   [facility]    This note was created using speech recognition software and may contain unintended word substitutions.    "

## 2023-05-11 NOTE — NURSING NOTE
"No chief complaint on file.      Vitals:    05/11/23 0834   BP: 117/78   BP Location: Left arm   Patient Position: Sitting   Cuff Size: Adult Regular   Pulse: 79   SpO2: 99%   Weight: 156 lb 4.8 oz   Height: 5' 4\"       Body mass index is 26.83 kg/m .     Eloy Khanna, EMT- P    "

## 2023-05-11 NOTE — LETTER
"2023       RE: Rebeca Walters  [address]  [facility] [address]     Dear Colleague,    Thank you for referring your patient, Rebeca Walters, to the [facility] [place] [facility]. Please see a copy of my visit note below.    Colon and Rectal Surgery Consult Clinic Note    Date: 2023     Referring provider:  Emmett Cano PA-C  [facility]  [address] [place] [address]     RE: Rebeca Walters  : 1995  NATI: 2023    Rebeca Walters is a very pleasant 27 year old female with rectal bleeding.    HPI:  Was having rectal bleeding constantly for about 3 weeks. Was then off and on and now no bleeding for about 2 weeks. Used a suppository that was helpful. Was having pain with bowel movements and this still occurs. Feels like tearing with large stools. Stools can be hard. Has celiac but she does not always stick to this diet. Takes a MVI with iron and this causes stools to be harder.  Family history of grandfather with Crohn's disease. Had a colonoscopy in 2015 or 2016 and that was normal.     Physical Examination:  /78 (BP Location: Left arm, Patient Position: Sitting, Cuff Size: Adult Regular)   Pulse 79   Ht 5' 4\"   Wt 156 lb 4.8 oz   SpO2 99%   BMI 26.83 kg/m    General: alert, oriented, in no acute distress, sitting comfortably  HEENT: mucous membranes moist     Perianal external examination: Exam was chaperoned by Eloy Khanna, EMT-P   Perianal skin: Intact with no excoriation or lichenification.  Lesions: No evidence of an external lesion, nodularity, or induration in the perianal region.  Eversion of buttocks: There was not evidence of an anal fissure. Details: anterior midline anal fissure.  Skin tags or external hemorrhoids: tiny skin tag in the posterior midline.    Digital rectal examination: Was deferred in order not to cause further " trauma    Anoscopy: Was deferred in order not to cause further trauma    Assessment/Plan: 27 year old female with anal fissure. I discussed that this is likely the source of the pain and bleeding. Discussed the importance of keeping stools soft and easy to pass while healing fissure.  Recommended starting on a daily fiber supplement and can add in a laxative in addition as needed.  Will treat with topical diltiazem with follow up in 8 weeks. Discussed that it may take several weeks for symptoms to improve. If no improvement is noted after 4 weeks, return to clinic and discuss further intervention such as Botox injections.  Advised the use of Tylenol, ibuprofen, and warm tub baths for any pain. Patient's questions were answered to her stated satisfaction and she is in agreement with this plan.     Medical history:  Past Medical History:   Diagnosis Date    Anxiety        Surgical history:  Past Surgical History:   Procedure Laterality Date    NO HISTORY OF SURGERY         Problem list:  There are no problems to display for this patient.      Medications:  Current Outpatient Medications   Medication Sig Dispense Refill    albuterol (PROAIR HFA/PROVENTIL HFA/VENTOLIN HFA) 108 (90 Base) MCG/ACT inhaler INHALE 2 PUFFS BY MOUTH INTO THE LUNGS EVERY 4-6 HOURS AS NEEDED FOR WHEEZE      EPINEPHrine (ANY BX GENERIC EQUIV) 0.3 MG/0.3ML injection 2-pack Inject 0.3 mg into the muscle      ferrous sulfate (FE TABS) 325 (65 Fe) MG EC tablet Take 1 tablet (325 mg) by mouth daily take with vitamin C or orange juice 90 tablet 0    fexofenadine (ALLEGRA) 180 MG tablet Take 180 mg by mouth daily      ISIBLOOM 0.15-30 MG-MCG tablet TAKE 1 TABLET BY MOUTH DAILY 84 tablet 0    Multiple Vitamins-Iron (MULTIVITAMIN/IRON PO) Take 1 tablet by mouth daily      propranolol (INDERAL) 10 MG tablet TAKE 1 TABLET BY MOUTH DAILY AS NEEDED FOR ANXIETY 90 tablet 1       Allergies:  Allergies   Allergen Reactions    Gluten Meal Diarrhea, Fatigue, Other  "(See Comments), GI Disturbance and Unknown       Family history:  Family History   Problem Relation Age of Onset    Breast Cancer Maternal Grandmother     Hyperlipidemia Paternal Grandfather        Social history:  Social History     Tobacco Use    Smoking status: Never    Smokeless tobacco: Never   Vaping Use    Vaping status: Not on file   Substance Use Topics    Alcohol use: Not on file    Marital status: .    Nursing Notes:   Eloy Khanna, EMT  5/11/2023  8:35 AM  Signed  No chief complaint on file.      Vitals:    05/11/23 0834   BP: 117/78   BP Location: Left arm   Patient Position: Sitting   Cuff Size: Adult Regular   Pulse: 79   SpO2: 99%   Weight: 156 lb 4.8 oz   Height: 5' 4\"       Body mass index is 26.83 kg/m .     Eloy Khanna, EMT- P    20 minutes spent on the date of encounter performing chart review, history and exam, documentation and further activities as noted above.    This note was created using speech recognition software and may contain unintended word substitutions.        Again, thank you for allowing me to participate in the care of your patient.      Sincerely,    MICHAEL Calvin CNP      "

## 2023-07-05 ENCOUNTER — TRANSFERRED RECORDS (OUTPATIENT)
Dept: MULTI SPECIALTY CLINIC | Facility: CLINIC | Age: 28
End: 2023-07-05

## 2023-07-05 LAB
HPV ABSTRACT: NORMAL
PAP-ABSTRACT: NORMAL

## 2023-09-01 ENCOUNTER — TELEPHONE (OUTPATIENT)
Dept: FAMILY MEDICINE | Facility: CLINIC | Age: 28
End: 2023-09-01
Payer: COMMERCIAL

## 2023-09-01 NOTE — CONFIDENTIAL NOTE
Patient Quality Outreach    Patient is due for the following:   Cervical Cancer Screening - PAP Needed    Next Steps:   Schedule a office visit for Pap    Type of outreach:    [employer] message.      Questions for provider review:    None           William Lord MA               0 [place] Counseling: I discussed with the patient the risks of guselkumab including but not limited to immunosuppression, serious infections, and drug reactions.  The patient understands that monitoring is required including a PPD at baseline and must alert us or the primary physician if symptoms of infection or other concerning signs are noted.

## 2024-10-06 ENCOUNTER — HEALTH MAINTENANCE LETTER (OUTPATIENT)
Age: 29
End: 2024-10-06